# Patient Record
Sex: FEMALE | Race: WHITE | NOT HISPANIC OR LATINO | URBAN - METROPOLITAN AREA
[De-identification: names, ages, dates, MRNs, and addresses within clinical notes are randomized per-mention and may not be internally consistent; named-entity substitution may affect disease eponyms.]

---

## 2022-07-09 ENCOUNTER — TELEPHONE ENCOUNTER (OUTPATIENT)
Dept: URBAN - METROPOLITAN AREA CLINIC 121 | Facility: CLINIC | Age: 72
End: 2022-07-09

## 2022-07-09 RX ORDER — ROSUVASTATIN CALCIUM 5 MG
TABLET ORAL
Refills: 0 | OUTPATIENT
Start: 2013-07-26 | End: 2014-06-20

## 2022-07-09 RX ORDER — AMLODIPINE BESYLATE 5 MG/1
TABLET ORAL
Refills: 0 | OUTPATIENT
Start: 2013-07-26 | End: 2014-06-20

## 2022-07-10 ENCOUNTER — TELEPHONE ENCOUNTER (OUTPATIENT)
Dept: URBAN - METROPOLITAN AREA CLINIC 121 | Facility: CLINIC | Age: 72
End: 2022-07-10

## 2022-07-10 RX ORDER — POLYETHYLENE GLYCOL 3350, SODIUM SULFATE, SODIUM CHLORIDE, POTASSIUM CHLORIDE, ASCORBIC ACID, SODIUM ASCORBATE 7.5-2.691G
KIT ORAL TAKE AS DIRECTED
Refills: 0 | Status: ACTIVE | COMMUNITY
Start: 2013-07-26

## 2022-07-10 RX ORDER — OMEGA-3S/DHA/EPA/FISH OIL 980-1400MG
CAPSULE,DELAYED RELEASE (ENTERIC COATED) ORAL ONCE A DAY
Refills: 0 | Status: ACTIVE | COMMUNITY
Start: 2014-06-20

## 2022-07-10 RX ORDER — DIPHENHYDRAMINE HCL 50 MG/1
CAPSULE ORAL ONCE A DAY
Refills: 0 | Status: ACTIVE | COMMUNITY
Start: 2014-06-20

## 2022-07-10 RX ORDER — ALENDRONATE SODIUM 70 MG/1
TABLET ORAL ONCE A DAY
Refills: 0 | Status: ACTIVE | COMMUNITY
Start: 2017-02-20

## 2022-07-10 RX ORDER — COLD-HOT PACK
EACH MISCELLANEOUS ONCE A DAY
Refills: 0 | Status: ACTIVE | COMMUNITY
Start: 2017-02-24

## 2022-07-10 RX ORDER — MULTIVITAMIN
TABLET ORAL ONCE A DAY
Refills: 0 | Status: ACTIVE | COMMUNITY
Start: 2014-06-20

## 2022-07-10 RX ORDER — ATORVASTATIN CALCIUM 20 MG/1
TABLET, FILM COATED ORAL ONCE A DAY
Refills: 0 | Status: ACTIVE | COMMUNITY
Start: 2017-01-12

## 2022-07-10 RX ORDER — SODIUM SULFATE, POTASSIUM SULFATE, MAGNESIUM SULFATE 17.5; 3.13; 1.6 G/ML; G/ML; G/ML
TAKE AS DIRECTED SOLUTION, CONCENTRATE ORAL TAKE AS DIRECTED
Refills: 0 | Status: ACTIVE | COMMUNITY
Start: 2017-02-24

## 2022-07-10 RX ORDER — ASPIRIN/ACETAMINOPHEN/CAFFEINE 250-250-65
TABLET ORAL TWICE A DAY
Refills: 0 | Status: ACTIVE | COMMUNITY
Start: 2019-06-28

## 2022-07-30 ENCOUNTER — TELEPHONE ENCOUNTER (OUTPATIENT)
Age: 72
End: 2022-07-30

## 2022-07-31 ENCOUNTER — TELEPHONE ENCOUNTER (OUTPATIENT)
Age: 72
End: 2022-07-31

## 2023-09-07 ENCOUNTER — P2P PATIENT RECORD (OUTPATIENT)
Age: 73
End: 2023-09-07

## 2023-09-07 ENCOUNTER — TELEPHONE ENCOUNTER (OUTPATIENT)
Dept: URBAN - METROPOLITAN AREA CLINIC 64 | Facility: CLINIC | Age: 73
End: 2023-09-07

## 2023-10-11 ENCOUNTER — TELEPHONE ENCOUNTER (OUTPATIENT)
Dept: URBAN - METROPOLITAN AREA CLINIC 64 | Facility: CLINIC | Age: 73
End: 2023-10-11

## 2023-10-16 ENCOUNTER — LAB OUTSIDE AN ENCOUNTER (OUTPATIENT)
Dept: URBAN - METROPOLITAN AREA CLINIC 60 | Facility: CLINIC | Age: 73
End: 2023-10-16

## 2023-10-16 ENCOUNTER — DASHBOARD ENCOUNTERS (OUTPATIENT)
Age: 73
End: 2023-10-16

## 2023-10-16 ENCOUNTER — OFFICE VISIT (OUTPATIENT)
Dept: URBAN - METROPOLITAN AREA CLINIC 60 | Facility: CLINIC | Age: 73
End: 2023-10-16
Payer: MEDICARE

## 2023-10-16 VITALS
TEMPERATURE: 98.2 F | WEIGHT: 110 LBS | SYSTOLIC BLOOD PRESSURE: 128 MMHG | HEIGHT: 63 IN | BODY MASS INDEX: 19.49 KG/M2 | OXYGEN SATURATION: 92 % | HEART RATE: 67 BPM | DIASTOLIC BLOOD PRESSURE: 60 MMHG

## 2023-10-16 DIAGNOSIS — R13.19 ESOPHAGEAL DYSPHAGIA: ICD-10-CM

## 2023-10-16 DIAGNOSIS — K62.5 RECTAL BLEEDING: ICD-10-CM

## 2023-10-16 DIAGNOSIS — K64.9 HEMORRHOIDS, UNSPECIFIED HEMORRHOID TYPE: ICD-10-CM

## 2023-10-16 PROBLEM — 70153002: Status: ACTIVE | Noted: 2023-10-16

## 2023-10-16 PROBLEM — 12063002: Status: ACTIVE | Noted: 2023-10-16

## 2023-10-16 PROBLEM — 40890009: Status: ACTIVE | Noted: 2023-10-16

## 2023-10-16 PROCEDURE — 99214 OFFICE O/P EST MOD 30 MIN: CPT | Performed by: PHYSICIAN ASSISTANT

## 2023-10-16 RX ORDER — ALENDRONATE SODIUM 70 MG/1
TABLET ORAL ONCE A DAY
Refills: 0 | Status: ACTIVE | COMMUNITY
Start: 2017-02-20

## 2023-10-16 RX ORDER — OMEGA-3S/DHA/EPA/FISH OIL 980-1400MG
CAPSULE,DELAYED RELEASE (ENTERIC COATED) ORAL ONCE A DAY
Refills: 0 | Status: ACTIVE | COMMUNITY
Start: 2014-06-20

## 2023-10-16 RX ORDER — PANTOPRAZOLE SODIUM 40 MG/1
TAKE ONE TABLET BY MOUTH EVERY MORNING TABLET, DELAYED RELEASE ORAL
Qty: 90 UNSPECIFIED | Refills: 1 | Status: ACTIVE | COMMUNITY

## 2023-10-16 RX ORDER — HYDROCORTISONE 25 MG/G
1 APPLICATION CREAM TOPICAL TWICE A DAY
Qty: 1 EACH | Refills: 1 | OUTPATIENT
Start: 2023-10-16 | End: 2023-10-30

## 2023-10-16 RX ORDER — ONDANSETRON HYDROCHLORIDE 4 MG/1
1 TABLET TABLET, FILM COATED ORAL
Qty: 2 | Refills: 0 | OUTPATIENT
Start: 2023-10-16

## 2023-10-16 RX ORDER — COLD-HOT PACK
EACH MISCELLANEOUS ONCE A DAY
Refills: 0 | Status: ACTIVE | COMMUNITY
Start: 2017-02-24

## 2023-10-16 RX ORDER — ASPIRIN/ACETAMINOPHEN/CAFFEINE 250-250-65
TABLET ORAL TWICE A DAY
Refills: 0 | Status: ACTIVE | COMMUNITY
Start: 2019-06-28

## 2023-10-16 RX ORDER — MULTIVITAMIN
TABLET ORAL ONCE A DAY
Refills: 0 | Status: ACTIVE | COMMUNITY
Start: 2014-06-20

## 2023-10-16 RX ORDER — ATORVASTATIN CALCIUM 20 MG/1
TABLET, FILM COATED ORAL ONCE A DAY
Refills: 0 | Status: ACTIVE | COMMUNITY
Start: 2017-01-12

## 2023-10-16 NOTE — HPI-TODAY'S VISIT:
Radha recently lost her  and has not been able to eat much, lost appetite from the heartbreak. c/o heartburn/sore throat pain, burning pain. PCP prescribed pantoprazole and an ulcer medication. Dr. Argueta wants her to have EGD but she is scared since last time she had a perforation.  Has hemorrhoids and recent bright red bleeding with bms. Her recent labwork is normal. Does not take any fiber or stool softner. Was taking a baby aspirin a day but stopped because of easy bruising. Drinks ETOH everyday, vodka , takes 3-4 days to drink the whole "handle"  bottle.   Using prep H cream and suppositories and wipes and cottonelle wet wipes.  Last colon 8/15/2019, Dr. Beckett: - Non-bleeding internal hemorrhoids. - Mild diverticulosis in the left colon. There was no evidence of diverticular bleeding. - No specimens collected Recall in 5 yearrs. Last EGD 6/8/2014 POSTOPERATIVE DIAGNOSES:  1. Normal appearing duodenum and proximal jejunum.  2. Mild gastritis.  3. 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  PROCEDURE: Esophagogastroduodenoscopy with push enteroscopy and control of  bleeding.  No cardiologist No pulmonologist. Former smoker, has a cough in the morning, quit smoking 4 years ago. Denies shortness or breath. No COPD or asthma.  Has seasonal allergies

## 2023-10-23 ENCOUNTER — LAB OUTSIDE AN ENCOUNTER (OUTPATIENT)
Dept: URBAN - METROPOLITAN AREA CLINIC 60 | Facility: CLINIC | Age: 73
End: 2023-10-23

## 2023-10-25 ENCOUNTER — OFFICE VISIT (OUTPATIENT)
Dept: URBAN - METROPOLITAN AREA CLINIC 63 | Facility: CLINIC | Age: 73
End: 2023-10-25

## 2023-10-31 ENCOUNTER — OFFICE VISIT (OUTPATIENT)
Dept: URBAN - METROPOLITAN AREA CLINIC 60 | Facility: CLINIC | Age: 73
End: 2023-10-31

## 2023-11-20 ENCOUNTER — TELEPHONE ENCOUNTER (OUTPATIENT)
Dept: URBAN - METROPOLITAN AREA CLINIC 63 | Facility: CLINIC | Age: 73
End: 2023-11-20

## 2023-11-21 ENCOUNTER — OFFICE VISIT (OUTPATIENT)
Dept: URBAN - METROPOLITAN AREA SURGERY CENTER 4 | Facility: SURGERY CENTER | Age: 73
End: 2023-11-21

## 2023-12-04 ENCOUNTER — OFFICE VISIT (OUTPATIENT)
Dept: URBAN - METROPOLITAN AREA CLINIC 60 | Facility: CLINIC | Age: 73
End: 2023-12-04

## 2024-08-21 ENCOUNTER — APPOINTMENT (RX ONLY)
Dept: URBAN - METROPOLITAN AREA CLINIC 131 | Facility: CLINIC | Age: 74
Setting detail: DERMATOLOGY
End: 2024-08-21

## 2024-08-21 DIAGNOSIS — L56.4 POLYMORPHOUS LIGHT ERUPTION: ICD-10-CM

## 2024-08-21 PROCEDURE — 99203 OFFICE O/P NEW LOW 30 MIN: CPT

## 2024-08-21 PROCEDURE — ? PRESCRIPTION

## 2024-08-21 PROCEDURE — ? COUNSELING

## 2024-08-21 RX ORDER — TRIAMCINOLONE ACETONIDE 1 MG/G
CREAM TOPICAL BID
Qty: 453.6 | Refills: 3 | Status: ERX | COMMUNITY
Start: 2024-08-21

## 2024-08-21 RX ADMIN — TRIAMCINOLONE ACETONIDE: 1 CREAM TOPICAL at 00:00

## 2024-08-21 ASSESSMENT — LOCATION SIMPLE DESCRIPTION DERM: LOCATION SIMPLE: CHEST

## 2024-08-21 ASSESSMENT — LOCATION ZONE DERM: LOCATION ZONE: TRUNK

## 2024-08-21 ASSESSMENT — LOCATION DETAILED DESCRIPTION DERM: LOCATION DETAILED: RIGHT MEDIAL SUPERIOR CHEST

## 2024-09-03 ENCOUNTER — TELEPHONE ENCOUNTER (OUTPATIENT)
Dept: URBAN - METROPOLITAN AREA CLINIC 60 | Facility: CLINIC | Age: 74
End: 2024-09-03

## 2024-09-09 ENCOUNTER — P2P PATIENT RECORD (OUTPATIENT)
Age: 74
End: 2024-09-09

## 2024-09-09 ENCOUNTER — TELEPHONE ENCOUNTER (OUTPATIENT)
Dept: URBAN - METROPOLITAN AREA CLINIC 60 | Facility: CLINIC | Age: 74
End: 2024-09-09

## 2024-09-13 ENCOUNTER — OFFICE VISIT (OUTPATIENT)
Dept: URBAN - METROPOLITAN AREA CLINIC 60 | Facility: CLINIC | Age: 74
End: 2024-09-13
Payer: MEDICARE

## 2024-09-13 VITALS
TEMPERATURE: 98.7 F | OXYGEN SATURATION: 96 % | BODY MASS INDEX: 19.21 KG/M2 | HEIGHT: 63 IN | HEART RATE: 71 BPM | SYSTOLIC BLOOD PRESSURE: 142 MMHG | DIASTOLIC BLOOD PRESSURE: 68 MMHG | WEIGHT: 108.4 LBS | RESPIRATION RATE: 12 BRPM

## 2024-09-13 DIAGNOSIS — K92.1 MELENA: ICD-10-CM

## 2024-09-13 DIAGNOSIS — R19.5 POSITIVE COLORECTAL CANCER SCREENING USING COLOGUARD TEST: ICD-10-CM

## 2024-09-13 DIAGNOSIS — Z80.0 FAMILY HISTORY OF COLON CANCER: ICD-10-CM

## 2024-09-13 PROCEDURE — 99214 OFFICE O/P EST MOD 30 MIN: CPT

## 2024-09-13 RX ORDER — CHLORHEXIDINE GLUCONATE 1.2 MG/ML
AFTER BRUSHING TEETH, SWISH 15 ML UNDILUTED FOR 30 SECONDS, THEN SPIT OUT. USE AFTER BREAKFAST AND BEFORE BEDTIME, OR USE AS PRESCRIBED RINSE ORAL
Qty: 473 UNSPECIFIED | Refills: 0 | Status: ACTIVE | COMMUNITY

## 2024-09-13 RX ORDER — PARSLEY 450 MG
AS DIRECTED CAPSULE ORAL
Status: ACTIVE | COMMUNITY

## 2024-09-13 RX ORDER — KRILL/OM-3/DHA/EPA/PHOSPHO/AST 1000-170MG
AS DIRECTED CAPSULE ORAL
Status: ACTIVE | COMMUNITY

## 2024-09-13 RX ORDER — CITALOPRAM HYDROBROMIDE 20 MG/1
TAKE ONE TABLET BY MOUTH EVERY MORNING TABLET ORAL
Qty: 90 UNSPECIFIED | Refills: 1 | Status: ACTIVE | COMMUNITY

## 2024-09-13 RX ORDER — OXYCODONE HYDROCHLORIDE AND ACETAMINOPHEN 5; 325 MG/1; MG/1
TAKE ONE TABLET BY MOUTH EVERY 4 TO 6 HOURS AS NEEDED FOR PAIN TABLET ORAL
Qty: 18 UNSPECIFIED | Refills: 0 | Status: ACTIVE | COMMUNITY

## 2024-09-13 RX ORDER — DOCUSATE SODIUM 100 MG
1 CAPSULE AS NEEDED CAPSULE ORAL ONCE A DAY
Status: ACTIVE | COMMUNITY

## 2024-09-13 RX ORDER — FAMOTIDINE 20 MG/1
TAKE ONE TABLET BY MOUTH ONE TIME DAILY AT DINNER TABLET, FILM COATED ORAL
Qty: 90 UNSPECIFIED | Refills: 0 | Status: ACTIVE | COMMUNITY

## 2024-09-13 RX ORDER — LORAZEPAM 0.5 MG/1
TAKE ONE TABLET BY MOUTH TWICE A DAY AS NEEDED TABLET ORAL
Qty: 60 UNSPECIFIED | Refills: 0 | Status: ACTIVE | COMMUNITY

## 2024-09-13 RX ORDER — METOPROLOL TARTRATE 25 MG/1
1 TABLET WITH FOOD TABLET, FILM COATED ORAL TWICE A DAY
Status: ACTIVE | COMMUNITY

## 2024-09-13 RX ORDER — ATORVASTATIN CALCIUM 20 MG/1
TAKE ONE TABLET BY MOUTH EVERY EVENING TABLET, FILM COATED ORAL
Qty: 90 UNSPECIFIED | Refills: 3 | Status: ACTIVE | COMMUNITY

## 2024-09-13 RX ORDER — MECOBALAMIN 5000 MCG
AS DIRECTED LOZENGE ORAL
Status: ACTIVE | COMMUNITY

## 2024-09-13 RX ORDER — UBIDECARENONE 75 MG
AS DIRECTED CAPSULE ORAL
Status: ACTIVE | COMMUNITY

## 2024-09-13 RX ORDER — SELENIUM 200 MCG
1 TABLET TABLET ORAL ONCE A DAY
Status: ACTIVE | COMMUNITY

## 2024-09-13 RX ORDER — PANTOPRAZOLE SODIUM 40 MG/1
TAKE ONE TABLET BY MOUTH EVERY MORNING TABLET, DELAYED RELEASE ORAL
Qty: 90 UNSPECIFIED | Refills: 3 | Status: ACTIVE | COMMUNITY

## 2024-09-13 RX ORDER — MAGNESIUM OXIDE/MAG AA CHELATE 300 MG
1 CAPSULE WITH A MEAL CAPSULE ORAL ONCE A DAY
Status: ACTIVE | COMMUNITY

## 2024-09-13 RX ORDER — FERROUS SULFATE 325(65) MG
1 TABLET TABLET ORAL
Status: ACTIVE | COMMUNITY

## 2024-09-13 NOTE — HPI-TODAY'S VISIT:
Patient is a 73-year-old female with past history of small bowel obstruction and Silvia-Kennedy tear who presents today with positive Cologuard test done with PCP and black tarry stool since November.  In November patient was admitted to the hospital for small bowel obstruction and at that time she states she had an EGD done which was unremarkable.  She did have a colonoscopy scheduled with us however due to the hospital admission she missed her appointment.  She is now ready to have colonoscopy scheduled and also states she has a family history of colon cancer in her mother.  She tells me that she is borderline anemic and is on iron supplementation.  Patient denies fever, dysphagia, acid reflux, change in appetite, abdominal pain, nausea, vomiting, diarrhea, constipation, hematemesis, hematochezia, change in stool frequency/caliber and unintentional weight loss/gain. She also denies history of stroke, heart attack, pacemaker, defibrillator, stents, blood thinners, COPD, asthma, ELENITA, chronic kidney disease and seizures. . Colonoscopy 8/15/2019 - Nonbleeding internal hemorrhoids - Mild diverticulosis in the left colon, no evidence of diverticular bleeding - No specimens collected - Repeat colonoscopy in 5 years . EGD 6/8/2014 with Dr. 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 Labs 7/23/2024 - Lipid panel: Total cholesterol 258, triglycerides 202, , non-HDL cholesterol 187 otherwise unremarkable - CMP: Glucose 102, potassium 5.6 otherwise unremarkable - TSH within normal limits - ESR, CRP, RF all within normal limits - REJI screen positive - REJI titer 1: 80, REJI pattern nuclear, speckled, homogenous

## 2024-09-16 ENCOUNTER — OFFICE VISIT (OUTPATIENT)
Dept: URBAN - METROPOLITAN AREA SURGERY CENTER 4 | Facility: SURGERY CENTER | Age: 74
End: 2024-09-16
Payer: MEDICARE

## 2024-09-16 ENCOUNTER — CLAIMS CREATED FROM THE CLAIM WINDOW (OUTPATIENT)
Dept: URBAN - METROPOLITAN AREA CLINIC 4 | Facility: CLINIC | Age: 74
End: 2024-09-16
Payer: MEDICARE

## 2024-09-16 DIAGNOSIS — R19.5 OTHER FECAL ABNORMALITIES: ICD-10-CM

## 2024-09-16 DIAGNOSIS — K57.30 DIVERTICULOSIS OF LARGE INTESTINE WITHOUT PERFORATION OR ABSCESS WITHOUT BLEEDING: ICD-10-CM

## 2024-09-16 DIAGNOSIS — Z12.11 COLON CANCER SCREENING: ICD-10-CM

## 2024-09-16 DIAGNOSIS — K64.8 OTHER HEMORRHOIDS: ICD-10-CM

## 2024-09-16 DIAGNOSIS — K44.9 DIAPHRAGMATIC HERNIA WITHOUT OBSTRUCTION OR GANGRENE: ICD-10-CM

## 2024-09-16 DIAGNOSIS — K44.9 HIATAL HERNIA: ICD-10-CM

## 2024-09-16 DIAGNOSIS — R19.5 POSITIVE COLOGUARD PROTOCOL: ICD-10-CM

## 2024-09-16 DIAGNOSIS — K31.89 OTHER DISEASES OF STOMACH AND DUODENUM: ICD-10-CM

## 2024-09-16 DIAGNOSIS — K29.70 GASTRITIS, UNSPECIFIED, WITHOUT BLEEDING: ICD-10-CM

## 2024-09-16 PROCEDURE — 43239 EGD BIOPSY SINGLE/MULTIPLE: CPT | Performed by: INTERNAL MEDICINE

## 2024-09-16 PROCEDURE — 45378 DIAGNOSTIC COLONOSCOPY: CPT | Performed by: INTERNAL MEDICINE

## 2024-09-16 PROCEDURE — 88312 SPECIAL STAINS GROUP 1: CPT | Performed by: PATHOLOGY

## 2024-09-16 PROCEDURE — 00813 ANES UPR LWR GI NDSC PX: CPT | Performed by: NURSE ANESTHETIST, CERTIFIED REGISTERED

## 2024-09-16 PROCEDURE — 88305 TISSUE EXAM BY PATHOLOGIST: CPT | Performed by: PATHOLOGY

## 2024-09-16 RX ORDER — LORAZEPAM 0.5 MG/1
TAKE ONE TABLET BY MOUTH TWICE A DAY AS NEEDED TABLET ORAL
Qty: 60 UNSPECIFIED | Refills: 0 | Status: ACTIVE | COMMUNITY

## 2024-09-16 RX ORDER — CITALOPRAM HYDROBROMIDE 20 MG/1
TAKE ONE TABLET BY MOUTH EVERY MORNING TABLET ORAL
Qty: 90 UNSPECIFIED | Refills: 1 | Status: ACTIVE | COMMUNITY

## 2024-09-16 RX ORDER — FERROUS SULFATE 325(65) MG
1 TABLET TABLET ORAL
Status: ACTIVE | COMMUNITY

## 2024-09-16 RX ORDER — DOCUSATE SODIUM 100 MG
1 CAPSULE AS NEEDED CAPSULE ORAL ONCE A DAY
Status: ACTIVE | COMMUNITY

## 2024-09-16 RX ORDER — OXYCODONE HYDROCHLORIDE AND ACETAMINOPHEN 5; 325 MG/1; MG/1
TAKE ONE TABLET BY MOUTH EVERY 4 TO 6 HOURS AS NEEDED FOR PAIN TABLET ORAL
Qty: 18 UNSPECIFIED | Refills: 0 | Status: ACTIVE | COMMUNITY

## 2024-09-16 RX ORDER — ATORVASTATIN CALCIUM 20 MG/1
TAKE ONE TABLET BY MOUTH EVERY EVENING TABLET, FILM COATED ORAL
Qty: 90 UNSPECIFIED | Refills: 3 | Status: ACTIVE | COMMUNITY

## 2024-09-16 RX ORDER — METOPROLOL TARTRATE 25 MG/1
1 TABLET WITH FOOD TABLET, FILM COATED ORAL TWICE A DAY
Status: ACTIVE | COMMUNITY

## 2024-09-16 RX ORDER — CHLORHEXIDINE GLUCONATE 1.2 MG/ML
AFTER BRUSHING TEETH, SWISH 15 ML UNDILUTED FOR 30 SECONDS, THEN SPIT OUT. USE AFTER BREAKFAST AND BEFORE BEDTIME, OR USE AS PRESCRIBED RINSE ORAL
Qty: 473 UNSPECIFIED | Refills: 0 | Status: ACTIVE | COMMUNITY

## 2024-09-16 RX ORDER — KRILL/OM-3/DHA/EPA/PHOSPHO/AST 1000-170MG
AS DIRECTED CAPSULE ORAL
Status: ACTIVE | COMMUNITY

## 2024-09-16 RX ORDER — MAGNESIUM OXIDE/MAG AA CHELATE 300 MG
1 CAPSULE WITH A MEAL CAPSULE ORAL ONCE A DAY
Status: ACTIVE | COMMUNITY

## 2024-09-16 RX ORDER — MECOBALAMIN 5000 MCG
AS DIRECTED LOZENGE ORAL
Status: ACTIVE | COMMUNITY

## 2024-09-16 RX ORDER — SELENIUM 200 MCG
1 TABLET TABLET ORAL ONCE A DAY
Status: ACTIVE | COMMUNITY

## 2024-09-16 RX ORDER — PANTOPRAZOLE SODIUM 40 MG/1
TAKE ONE TABLET BY MOUTH EVERY MORNING TABLET, DELAYED RELEASE ORAL
Qty: 90 UNSPECIFIED | Refills: 3 | Status: ACTIVE | COMMUNITY

## 2024-09-16 RX ORDER — FAMOTIDINE 20 MG/1
TAKE ONE TABLET BY MOUTH ONE TIME DAILY AT DINNER TABLET, FILM COATED ORAL
Qty: 90 UNSPECIFIED | Refills: 0 | Status: ACTIVE | COMMUNITY

## 2024-09-16 RX ORDER — PARSLEY 450 MG
AS DIRECTED CAPSULE ORAL
Status: ACTIVE | COMMUNITY

## 2024-09-16 RX ORDER — UBIDECARENONE 75 MG
AS DIRECTED CAPSULE ORAL
Status: ACTIVE | COMMUNITY

## 2024-09-20 ENCOUNTER — LAB OUTSIDE AN ENCOUNTER (OUTPATIENT)
Dept: URBAN - METROPOLITAN AREA CLINIC 60 | Facility: CLINIC | Age: 74
End: 2024-09-20

## 2024-10-02 ENCOUNTER — OFFICE VISIT (OUTPATIENT)
Dept: URBAN - METROPOLITAN AREA CLINIC 60 | Facility: CLINIC | Age: 74
End: 2024-10-02

## 2024-10-02 RX ORDER — FERROUS SULFATE 325(65) MG
1 TABLET TABLET ORAL
Status: ACTIVE | COMMUNITY

## 2024-10-02 RX ORDER — KRILL/OM-3/DHA/EPA/PHOSPHO/AST 1000-170MG
AS DIRECTED CAPSULE ORAL
Status: ACTIVE | COMMUNITY

## 2024-10-02 RX ORDER — LORAZEPAM 0.5 MG/1
TAKE ONE TABLET BY MOUTH TWICE A DAY AS NEEDED TABLET ORAL
Qty: 60 UNSPECIFIED | Refills: 0 | Status: ACTIVE | COMMUNITY

## 2024-10-02 RX ORDER — CITALOPRAM HYDROBROMIDE 20 MG/1
TAKE ONE TABLET BY MOUTH EVERY MORNING TABLET ORAL
Qty: 90 UNSPECIFIED | Refills: 1 | Status: ACTIVE | COMMUNITY

## 2024-10-02 RX ORDER — MAGNESIUM OXIDE/MAG AA CHELATE 300 MG
1 CAPSULE WITH A MEAL CAPSULE ORAL ONCE A DAY
Status: ACTIVE | COMMUNITY

## 2024-10-02 RX ORDER — METOPROLOL TARTRATE 25 MG/1
1 TABLET WITH FOOD TABLET, FILM COATED ORAL TWICE A DAY
Status: ACTIVE | COMMUNITY

## 2024-10-02 RX ORDER — CHLORHEXIDINE GLUCONATE 1.2 MG/ML
AFTER BRUSHING TEETH, SWISH 15 ML UNDILUTED FOR 30 SECONDS, THEN SPIT OUT. USE AFTER BREAKFAST AND BEFORE BEDTIME, OR USE AS PRESCRIBED RINSE ORAL
Qty: 473 UNSPECIFIED | Refills: 0 | Status: ACTIVE | COMMUNITY

## 2024-10-02 RX ORDER — OXYCODONE HYDROCHLORIDE AND ACETAMINOPHEN 5; 325 MG/1; MG/1
TAKE ONE TABLET BY MOUTH EVERY 4 TO 6 HOURS AS NEEDED FOR PAIN TABLET ORAL
Qty: 18 UNSPECIFIED | Refills: 0 | Status: ACTIVE | COMMUNITY

## 2024-10-02 RX ORDER — SELENIUM 200 MCG
1 TABLET TABLET ORAL ONCE A DAY
Status: ACTIVE | COMMUNITY

## 2024-10-02 RX ORDER — MECOBALAMIN 5000 MCG
AS DIRECTED LOZENGE ORAL
Status: ACTIVE | COMMUNITY

## 2024-10-02 RX ORDER — UBIDECARENONE 75 MG
AS DIRECTED CAPSULE ORAL
Status: ACTIVE | COMMUNITY

## 2024-10-02 RX ORDER — ATORVASTATIN CALCIUM 20 MG/1
TAKE ONE TABLET BY MOUTH EVERY EVENING TABLET, FILM COATED ORAL
Qty: 90 UNSPECIFIED | Refills: 3 | Status: ACTIVE | COMMUNITY

## 2024-10-02 RX ORDER — PANTOPRAZOLE SODIUM 40 MG/1
TAKE ONE TABLET BY MOUTH EVERY MORNING TABLET, DELAYED RELEASE ORAL
Qty: 90 UNSPECIFIED | Refills: 3 | Status: ACTIVE | COMMUNITY

## 2024-10-02 RX ORDER — FAMOTIDINE 20 MG/1
TAKE ONE TABLET BY MOUTH ONE TIME DAILY AT DINNER TABLET, FILM COATED ORAL
Qty: 90 UNSPECIFIED | Refills: 0 | Status: ACTIVE | COMMUNITY

## 2024-10-02 RX ORDER — PARSLEY 450 MG
AS DIRECTED CAPSULE ORAL
Status: ACTIVE | COMMUNITY

## 2024-10-02 RX ORDER — DOCUSATE SODIUM 100 MG
1 CAPSULE AS NEEDED CAPSULE ORAL ONCE A DAY
Status: ACTIVE | COMMUNITY

## 2024-10-02 NOTE — HPI-TODAY'S VISIT:
LOV 9/13/2024 for melena and positive Cologuard Review EGD and colonoscopy    LOV Patient is a 73-year-old female with past history of small bowel obstruction and Silvia-Kennedy tear who presents today with positive Cologuard test done with PCP and black tarry stool since November.  In November patient was admitted to the hospital for small bowel obstruction and at that time she states she had an EGD done which was unremarkable.  She did have a colonoscopy scheduled with us however due to the hospital admission she missed her appointment.  She is now ready to have colonoscopy scheduled and also states she has a family history of colon cancer in her mother.  She tells me that she is borderline anemic and is on iron supplementation.  Patient denies fever, dysphagia, acid reflux, change in appetite, abdominal pain, nausea, vomiting, diarrhea, constipation, hematemesis, hematochezia, change in stool frequency/caliber and unintentional weight loss/gain. She also denies history of stroke, heart attack, pacemaker, defibrillator, stents, blood thinners, COPD, asthma, ELENITA, chronic kidney disease and seizures.   . Colonoscopy 9/16/2024 - Internal hemorrhoids - Diverticulosis in left colon - No specimens collected - Repeat colonoscopy in 5 years . EGD 9/16/2024 - Normal esophagus - Normal stomach, biopsy - Normal examined duodenum, biopsy - 1 cm hiatal hernia - Pathology: Second part duodenum biopsy-no significant abnormality; stomach antrum/body biopsy-mild chronic gastritis, nonspecific, no evidence of H. pylori or intestinal metaplasia, negative for dysplasia or malignancy . Colonoscopy 8/15/2019 - Nonbleeding internal hemorrhoids - Mild diverticulosis in the left colon, no evidence of diverticular bleeding - No specimens collected - Repeat colonoscopy in 5 years . EGD 6/8/2014 with Dr. 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 Labs 7/23/2024 - Lipid panel: Total cholesterol 258, triglycerides 202, , non-HDL cholesterol 187 otherwise unremarkable - CMP: Glucose 102, potassium 5.6 otherwise unremarkable - TSH within normal limits - ESR, CRP, RF all within normal limits - REJI screen positive - REJI titer 1: 80, REJI pattern nuclear, speckled, homogenous

## 2024-10-10 ENCOUNTER — OFFICE VISIT (OUTPATIENT)
Dept: URBAN - METROPOLITAN AREA CLINIC 60 | Facility: CLINIC | Age: 74
End: 2024-10-10

## 2024-10-18 ENCOUNTER — TELEPHONE ENCOUNTER (OUTPATIENT)
Dept: URBAN - METROPOLITAN AREA CLINIC 64 | Facility: CLINIC | Age: 74
End: 2024-10-18

## 2024-10-25 ENCOUNTER — OFFICE VISIT (OUTPATIENT)
Dept: URBAN - METROPOLITAN AREA CLINIC 60 | Facility: CLINIC | Age: 74
End: 2024-10-25

## 2024-10-25 NOTE — HPI-TODAY'S VISIT:
LOV 9/13/2024 for melena and positive Cologuard Review EGD and colonoscopy    LOV Patient is a 73-year-old female with past history of small bowel obstruction and Silvia-Kennedy tear who presents today with positive Cologuard test done with PCP and black tarry stool since November.  In November patient was admitted to the hospital for small bowel obstruction and at that time she states she had an EGD done which was unremarkable.  She did have a colonoscopy scheduled with us however due to the hospital admission she missed her appointment.  She is now ready to have colonoscopy scheduled and also states she has a family history of colon cancer in her mother.  She tells me that she is borderline anemic and is on iron supplementation.  Patient denies fever, dysphagia, acid reflux, change in appetite, abdominal pain, nausea, vomiting, diarrhea, constipation, hematemesis, hematochezia, change in stool frequency/caliber and unintentional weight loss/gain. She also denies history of stroke, heart attack, pacemaker, defibrillator, stents, blood thinners, COPD, asthma, ELENITA, chronic kidney disease and seizures.   . Colonoscopy 9/16/2024 - Internal hemorrhoids - Diverticulosis in left colon - No specimens collected - Repeat colonoscopy in 5 years . EGD 9/16/2024 - Normal esophagus - Normal stomach, biopsy - Normal examined duodenum, biopsy - 1 cm hiatal hernia - Pathology: Second part duodenum biopsy-no significant abnormality; stomach antrum/body biopsy-mild chronic gastritis, nonspecific, no evidence of H. pylori or intestinal metaplasia, negative for dysplasia or malignancy . Colonoscopy 8/15/2019 - Nonbleeding internal hemorrhoids - Mild diverticulosis in the left colon, no evidence of diverticular bleeding - No specimens collected - Repeat colonoscopy in 5 years . EGD 6/8/2014 with Dr. 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 Labs 7/23/2024 - Lipid panel: Total cholesterol 258, triglycerides 202, , non-HDL cholesterol 187 otherwise unremarkable - CMP: Glucose 102, potassium 5.6 otherwise unremarkable - TSH within normal limits - ESR, CRP, RF all within normal limits - REJI screen positive - REJI titer 1: 80, REJI pattern nuclear, speckled, homogenous

## 2024-10-31 ENCOUNTER — OFFICE VISIT (OUTPATIENT)
Dept: URBAN - METROPOLITAN AREA CLINIC 60 | Facility: CLINIC | Age: 74
End: 2024-10-31

## 2024-10-31 ENCOUNTER — LAB OUTSIDE AN ENCOUNTER (OUTPATIENT)
Dept: URBAN - METROPOLITAN AREA CLINIC 60 | Facility: CLINIC | Age: 74
End: 2024-10-31

## 2024-10-31 VITALS
SYSTOLIC BLOOD PRESSURE: 144 MMHG | HEIGHT: 63 IN | RESPIRATION RATE: 12 BRPM | TEMPERATURE: 98.7 F | HEART RATE: 68 BPM | DIASTOLIC BLOOD PRESSURE: 66 MMHG | WEIGHT: 109 LBS | BODY MASS INDEX: 19.31 KG/M2 | OXYGEN SATURATION: 97 %

## 2024-10-31 RX ORDER — CHLORHEXIDINE GLUCONATE 1.2 MG/ML
AFTER BRUSHING TEETH, SWISH 15 ML UNDILUTED FOR 30 SECONDS, THEN SPIT OUT. USE AFTER BREAKFAST AND BEFORE BEDTIME, OR USE AS PRESCRIBED RINSE ORAL
Qty: 473 UNSPECIFIED | Refills: 0 | Status: ACTIVE | COMMUNITY

## 2024-10-31 RX ORDER — METOPROLOL TARTRATE 25 MG/1
1 TABLET WITH FOOD TABLET, FILM COATED ORAL TWICE A DAY
Status: ACTIVE | COMMUNITY

## 2024-10-31 RX ORDER — SELENIUM 200 MCG
1 TABLET TABLET ORAL ONCE A DAY
Status: ACTIVE | COMMUNITY

## 2024-10-31 RX ORDER — FERROUS SULFATE 325(65) MG
1 TABLET TABLET ORAL
Status: ACTIVE | COMMUNITY

## 2024-10-31 RX ORDER — MAGNESIUM OXIDE/MAG AA CHELATE 300 MG
1 CAPSULE WITH A MEAL CAPSULE ORAL ONCE A DAY
Status: ACTIVE | COMMUNITY

## 2024-10-31 RX ORDER — OXYCODONE HYDROCHLORIDE AND ACETAMINOPHEN 5; 325 MG/1; MG/1
TAKE ONE TABLET BY MOUTH EVERY 4 TO 6 HOURS AS NEEDED FOR PAIN TABLET ORAL
Qty: 18 UNSPECIFIED | Refills: 0 | Status: ACTIVE | COMMUNITY

## 2024-10-31 RX ORDER — FAMOTIDINE 20 MG/1
TAKE ONE TABLET BY MOUTH ONE TIME DAILY AT DINNER TABLET, FILM COATED ORAL
Qty: 90 UNSPECIFIED | Refills: 0 | Status: ACTIVE | COMMUNITY

## 2024-10-31 RX ORDER — KRILL/OM-3/DHA/EPA/PHOSPHO/AST 1000-170MG
AS DIRECTED CAPSULE ORAL
Status: ACTIVE | COMMUNITY

## 2024-10-31 RX ORDER — PANTOPRAZOLE SODIUM 40 MG/1
TAKE ONE TABLET BY MOUTH EVERY MORNING TABLET, DELAYED RELEASE ORAL
Qty: 90 UNSPECIFIED | Refills: 3 | Status: ACTIVE | COMMUNITY

## 2024-10-31 RX ORDER — ATORVASTATIN CALCIUM 20 MG/1
TAKE ONE TABLET BY MOUTH EVERY EVENING TABLET, FILM COATED ORAL
Qty: 90 UNSPECIFIED | Refills: 3 | Status: ACTIVE | COMMUNITY

## 2024-10-31 RX ORDER — LORAZEPAM 0.5 MG/1
TAKE ONE TABLET BY MOUTH TWICE A DAY AS NEEDED TABLET ORAL
Qty: 60 UNSPECIFIED | Refills: 0 | Status: ACTIVE | COMMUNITY

## 2024-10-31 RX ORDER — DOCUSATE SODIUM 100 MG
1 CAPSULE AS NEEDED CAPSULE ORAL ONCE A DAY
Status: ACTIVE | COMMUNITY

## 2024-10-31 RX ORDER — UBIDECARENONE 75 MG
AS DIRECTED CAPSULE ORAL
Status: ACTIVE | COMMUNITY

## 2024-10-31 RX ORDER — PARSLEY 450 MG
AS DIRECTED CAPSULE ORAL
Status: ACTIVE | COMMUNITY

## 2024-10-31 RX ORDER — MECOBALAMIN 5000 MCG
AS DIRECTED LOZENGE ORAL
Status: ACTIVE | COMMUNITY

## 2024-10-31 RX ORDER — CITALOPRAM HYDROBROMIDE 20 MG/1
TAKE ONE TABLET BY MOUTH EVERY MORNING TABLET ORAL
Qty: 90 UNSPECIFIED | Refills: 1 | Status: ACTIVE | COMMUNITY

## 2024-10-31 NOTE — HPI-TODAY'S VISIT:
Patient is a 73-year-old female with past medical history of small bowel obstruction and Silvia-Kennedy tear along with a family history of colon cancer in her mother who presents today for follow-up on EGD and colonoscopy.  At today's visit patient states that she is still experiencing black tarry stools daily which has been ongoing since November 2023 after she was admitted to the hospital for SBO.  Reviewed EGD and colonoscopy findings with patient.  Discussed further workup with small bowel follow-through and capsule endoscopy which patient is agreeable to. She denies Pepto-Bismol use. She is borderline anemic and is on iron supplementation. Did discuss with her that this may turn stool a dark color. Patient denies fever, dysphagia, acid reflux, change in appetite, abdominal pain, nausea, vomiting, diarrhea, constipation, hematemesis, hematochezia, change in stool frequency/caliber and unintentional weight loss/gain.  . Colonoscopy 9/16/2024 - Internal hemorrhoids - Diverticulosis in left colon - No specimens collected - Repeat colonoscopy in 5 years . EGD 9/16/2024 - Normal esophagus - Normal stomach, biopsy - Normal examined duodenum, biopsy - 1 cm hiatal hernia - Pathology: Second part duodenum biopsy-no significant abnormality; stomach antrum/body biopsy-mild chronic gastritis, nonspecific, no evidence of H. pylori or intestinal metaplasia, negative for dysplasia or malignancy . Colonoscopy 8/15/2019 - Nonbleeding internal hemorrhoids - Mild diverticulosis in the left colon, no evidence of diverticular bleeding - No specimens collected - Repeat colonoscopy in 5 years . EGD 6/8/2014 with Dr. 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 Labs 7/23/2024 - Lipid panel: Total cholesterol 258, triglycerides 202, , non-HDL cholesterol 187 otherwise unremarkable - CMP: Glucose 102, potassium 5.6 otherwise unremarkable - TSH within normal limits - ESR, CRP, RF all within normal limits - REJI screen positive - REJI titer 1: 80, REJI pattern nuclear, speckled, homogenous

## 2024-11-04 ENCOUNTER — TELEPHONE ENCOUNTER (OUTPATIENT)
Dept: URBAN - METROPOLITAN AREA CLINIC 60 | Facility: CLINIC | Age: 74
End: 2024-11-04

## 2024-12-11 ENCOUNTER — TELEPHONE ENCOUNTER (OUTPATIENT)
Dept: URBAN - METROPOLITAN AREA CLINIC 60 | Facility: CLINIC | Age: 74
End: 2024-12-11

## 2025-01-07 ENCOUNTER — TELEPHONE ENCOUNTER (OUTPATIENT)
Dept: URBAN - METROPOLITAN AREA CLINIC 60 | Facility: CLINIC | Age: 75
End: 2025-01-07

## 2025-01-08 ENCOUNTER — OFFICE VISIT (OUTPATIENT)
Dept: URBAN - METROPOLITAN AREA CLINIC 61 | Facility: CLINIC | Age: 75
End: 2025-01-08

## 2025-01-15 ENCOUNTER — OFFICE VISIT (OUTPATIENT)
Dept: URBAN - METROPOLITAN AREA CLINIC 61 | Facility: CLINIC | Age: 75
End: 2025-01-15
Payer: COMMERCIAL

## 2025-01-15 DIAGNOSIS — K92.1 MELENA: ICD-10-CM

## 2025-01-15 PROCEDURE — 91110 GI TRC IMG INTRAL ESOPH-ILE: CPT | Performed by: INTERNAL MEDICINE

## 2025-01-15 RX ORDER — SELENIUM 200 MCG
1 TABLET TABLET ORAL ONCE A DAY
Status: ACTIVE | COMMUNITY

## 2025-01-15 RX ORDER — KRILL/OM-3/DHA/EPA/PHOSPHO/AST 1000-170MG
AS DIRECTED CAPSULE ORAL
Status: ACTIVE | COMMUNITY

## 2025-01-15 RX ORDER — PARSLEY 450 MG
AS DIRECTED CAPSULE ORAL
Status: ACTIVE | COMMUNITY

## 2025-01-15 RX ORDER — UBIDECARENONE 75 MG
AS DIRECTED CAPSULE ORAL
Status: ACTIVE | COMMUNITY

## 2025-01-15 RX ORDER — FERROUS SULFATE 325(65) MG
1 TABLET TABLET ORAL
Status: ACTIVE | COMMUNITY

## 2025-01-15 RX ORDER — FAMOTIDINE 20 MG/1
TAKE ONE TABLET BY MOUTH ONE TIME DAILY AT DINNER TABLET, FILM COATED ORAL
Qty: 90 UNSPECIFIED | Refills: 0 | Status: ACTIVE | COMMUNITY

## 2025-01-15 RX ORDER — MECOBALAMIN 5000 MCG
AS DIRECTED LOZENGE ORAL
Status: ACTIVE | COMMUNITY

## 2025-01-15 RX ORDER — OXYCODONE HYDROCHLORIDE AND ACETAMINOPHEN 5; 325 MG/1; MG/1
TAKE ONE TABLET BY MOUTH EVERY 4 TO 6 HOURS AS NEEDED FOR PAIN TABLET ORAL
Qty: 18 UNSPECIFIED | Refills: 0 | Status: ACTIVE | COMMUNITY

## 2025-01-15 RX ORDER — MAGNESIUM OXIDE/MAG AA CHELATE 300 MG
1 CAPSULE WITH A MEAL CAPSULE ORAL ONCE A DAY
Status: ACTIVE | COMMUNITY

## 2025-01-15 RX ORDER — PANTOPRAZOLE SODIUM 40 MG/1
TAKE ONE TABLET BY MOUTH EVERY MORNING TABLET, DELAYED RELEASE ORAL
Qty: 90 UNSPECIFIED | Refills: 3 | Status: ACTIVE | COMMUNITY

## 2025-01-15 RX ORDER — LORAZEPAM 0.5 MG/1
TAKE ONE TABLET BY MOUTH TWICE A DAY AS NEEDED TABLET ORAL
Qty: 60 UNSPECIFIED | Refills: 0 | Status: ACTIVE | COMMUNITY

## 2025-01-15 RX ORDER — ATORVASTATIN CALCIUM 20 MG/1
TAKE ONE TABLET BY MOUTH EVERY EVENING TABLET, FILM COATED ORAL
Qty: 90 UNSPECIFIED | Refills: 3 | Status: ACTIVE | COMMUNITY

## 2025-01-15 RX ORDER — DOCUSATE SODIUM 100 MG
1 CAPSULE AS NEEDED CAPSULE ORAL ONCE A DAY
Status: ACTIVE | COMMUNITY

## 2025-01-15 RX ORDER — CITALOPRAM HYDROBROMIDE 20 MG/1
TAKE ONE TABLET BY MOUTH EVERY MORNING TABLET ORAL
Qty: 90 UNSPECIFIED | Refills: 1 | Status: ACTIVE | COMMUNITY

## 2025-01-15 RX ORDER — CHLORHEXIDINE GLUCONATE 1.2 MG/ML
AFTER BRUSHING TEETH, SWISH 15 ML UNDILUTED FOR 30 SECONDS, THEN SPIT OUT. USE AFTER BREAKFAST AND BEFORE BEDTIME, OR USE AS PRESCRIBED RINSE ORAL
Qty: 473 UNSPECIFIED | Refills: 0 | Status: ACTIVE | COMMUNITY

## 2025-01-15 RX ORDER — METOPROLOL TARTRATE 25 MG/1
1 TABLET WITH FOOD TABLET, FILM COATED ORAL TWICE A DAY
Status: ACTIVE | COMMUNITY

## 2025-01-17 ENCOUNTER — TELEPHONE ENCOUNTER (OUTPATIENT)
Dept: URBAN - METROPOLITAN AREA CLINIC 63 | Facility: CLINIC | Age: 75
End: 2025-01-17

## 2025-01-24 ENCOUNTER — TELEPHONE ENCOUNTER (OUTPATIENT)
Dept: URBAN - METROPOLITAN AREA CLINIC 63 | Facility: CLINIC | Age: 75
End: 2025-01-24

## 2025-06-19 ENCOUNTER — APPOINTMENT (OUTPATIENT)
Dept: URBAN - METROPOLITAN AREA CLINIC 131 | Facility: CLINIC | Age: 75
Setting detail: DERMATOLOGY
End: 2025-06-19

## 2025-06-19 DIAGNOSIS — L57.8 OTHER SKIN CHANGES DUE TO CHRONIC EXPOSURE TO NONIONIZING RADIATION: ICD-10-CM

## 2025-06-19 DIAGNOSIS — L82.1 OTHER SEBORRHEIC KERATOSIS: ICD-10-CM

## 2025-06-19 DIAGNOSIS — Z85.828 PERSONAL HISTORY OF OTHER MALIGNANT NEOPLASM OF SKIN: ICD-10-CM

## 2025-06-19 DIAGNOSIS — Z86.006 PERSONAL HISTORY OF MELANOMA IN-SITU: ICD-10-CM

## 2025-06-19 DIAGNOSIS — D18.0 HEMANGIOMA: ICD-10-CM

## 2025-06-19 PROBLEM — D18.01 HEMANGIOMA OF SKIN AND SUBCUTANEOUS TISSUE: Status: ACTIVE | Noted: 2025-06-19

## 2025-06-19 PROCEDURE — ? COUNSELING

## 2025-06-19 PROCEDURE — ? SUNSCREEN RECOMMENDATIONS

## 2025-06-19 PROCEDURE — ?

## 2025-06-19 ASSESSMENT — LOCATION DETAILED DESCRIPTION DERM
LOCATION DETAILED: LEFT PROXIMAL DORSAL FOREARM
LOCATION DETAILED: LOWER STERNUM
LOCATION DETAILED: RIGHT INFERIOR MEDIAL UPPER BACK
LOCATION DETAILED: RIGHT PROXIMAL DORSAL FOREARM
LOCATION DETAILED: MIDDLE STERNUM
LOCATION DETAILED: RIGHT MEDIAL UPPER BACK

## 2025-06-19 ASSESSMENT — LOCATION ZONE DERM
LOCATION ZONE: TRUNK
LOCATION ZONE: ARM

## 2025-06-19 ASSESSMENT — LOCATION SIMPLE DESCRIPTION DERM
LOCATION SIMPLE: RIGHT UPPER BACK
LOCATION SIMPLE: LEFT FOREARM
LOCATION SIMPLE: CHEST
LOCATION SIMPLE: RIGHT FOREARM